# Patient Record
Sex: MALE | Race: WHITE | NOT HISPANIC OR LATINO | URBAN - METROPOLITAN AREA
[De-identification: names, ages, dates, MRNs, and addresses within clinical notes are randomized per-mention and may not be internally consistent; named-entity substitution may affect disease eponyms.]

---

## 2017-04-20 ENCOUNTER — OFFICE VISIT - HEALTHEAST (OUTPATIENT)
Dept: INTERNAL MEDICINE | Facility: CLINIC | Age: 27
End: 2017-04-20

## 2017-04-20 DIAGNOSIS — R11.0 NAUSEA: ICD-10-CM

## 2017-04-20 DIAGNOSIS — R53.81 MALAISE: ICD-10-CM

## 2017-04-20 DIAGNOSIS — K52.9 GASTROENTERITIS: ICD-10-CM

## 2017-04-20 ASSESSMENT — MIFFLIN-ST. JEOR: SCORE: 1988.72

## 2017-04-21 ENCOUNTER — COMMUNICATION - HEALTHEAST (OUTPATIENT)
Dept: INTERNAL MEDICINE | Facility: CLINIC | Age: 27
End: 2017-04-21

## 2019-02-13 ENCOUNTER — RECORDS - HEALTHEAST (OUTPATIENT)
Dept: ADMINISTRATIVE | Facility: OTHER | Age: 29
End: 2019-02-13

## 2019-02-13 ENCOUNTER — AMBULATORY - HEALTHEAST (OUTPATIENT)
Dept: MULTI SPECIALTY CLINIC | Facility: CLINIC | Age: 29
End: 2019-02-13

## 2019-02-13 LAB — HBA1C MFR BLD: 7.5 % (ref 0–5.6)

## 2019-03-26 ENCOUNTER — AMBULATORY - HEALTHEAST (OUTPATIENT)
Dept: INTERNAL MEDICINE | Facility: CLINIC | Age: 29
End: 2019-03-26

## 2019-03-26 LAB
A1C_EXT- HISTORICAL: 7.5
CHLAMYDIA_EXT- HISTORICAL: NORMAL
INR_EXT- HISTORICAL: NORMAL
LDLC SERPL CALC-MCNC: NORMAL MG/DL
LEAD_EXT- HISTORICAL: NORMAL
MICROALB_EXT- HISTORICAL: NORMAL
PAP SMEAR - HIM PATIENT REPORTED: NORMAL

## 2021-05-30 VITALS — WEIGHT: 218.19 LBS | HEIGHT: 72 IN | BODY MASS INDEX: 29.55 KG/M2

## 2021-06-02 ENCOUNTER — RECORDS - HEALTHEAST (OUTPATIENT)
Dept: ADMINISTRATIVE | Facility: CLINIC | Age: 31
End: 2021-06-02

## 2021-06-10 NOTE — PROGRESS NOTES
[facility] Follow Up Note    Assessment/Plan:    1. Malaise and gastroenteritis  Because of most likely his symptoms are viral.  Previous testing for strep and mono were negative.  Discussed that mononucleosis might be falsely negative if checked too early.  Giving his lymphadenopathy in the posterior cervical chain which is fairly significant and I suspect that he does have mononucleosis.  Despite that his malaise is due to dehydration.  Discussed that he will need to increase oral fluid intake and at least drink 5 Pedialyte in a day.  He will continue insulin coverage for his sugars.  We will check blood work today.  If he is not feeling better or feeling worse I did encourage him to come to the emergency room.  - HM1(CBC and Differential)  - Comprehensive Metabolic Panel  - HM1 (CBC with Diff)    2. Nausea  Vomiting.  I will give him some Zofran so he can drink more fluids orally.  If unable to hydrate orally will need IV fluids.  - ondansetron (ZOFRAN) 4 MG tablet; Take 1 tablet (4 mg total) by mouth every 8 (eight) hours as needed for nausea.  Dispense: 20 tablet; Refill: 0    3.  Type 1 diabetes.  Continue insulin.  She will check sugars at home 3 times a day and cover as needed.    Precious Post MD    Chief Complaint:  Chief Complaint   Patient presents with     Establish Care     Chills     nausea, diarrhea, lethargic x 2 days     Headache       History of Present Illness:  Kenan is a 27 y.o. male history of type 1 diabetes, increased creatinine levels who is currently here for acute visit due to not feeling well.    Patient symptoms started 3 days ago.  First he developed lymphadenopathy in his neck and then subsequently a headache, chills significant fatigue and feeling like he is sleepy all the time.  He denies any sore throat.  Had only mild nasal congestion.  His appetite is very low and he has not eaten anything in the last 36 hours.  Yesterday he also started having diarrhea: 5 bowel  movements a day, watery.  He has mild nausea but no vomiting.  He has been checking his sugars.  This morning his sugar was 110.  Yesterday his sugar was around 200.  Patient continues to use 20 units of long-acting insulin as well as sliding scale on short acting insulin and coverage for carbohydrate intake.  Patient complains of frontal diffuse headache.  No fevers.  Over-the-counter he has tried Advil.  Blood work from care everywhere was reviewed.  His A1c was checked earlier this year was 7.1.  His creatinine is chronically elevated and the most recent value was 1.79.  He was told that his creatinine function is elevated because of exercise (patient works as a cross feet  and instructor).    Because patient was not feeling well [facility]  Testing that was negative for strep and mononucleosis and the recommended that he goes to urgent care.  He did go to urgent care yesterday and they did not do anything but recommended that he comes to the emergency room.  Patient has not done that yet.  We discussed that most likely he is dehydrated and getting IV fluids in the emergency room might be helpful.  Currently patient drinks Pedialyte but only had 2 bottles since yesterday.  He has not been eating solid foods.    Review of Systems:  A comprehensive review of systems was performed and was otherwise negative    PFSH:  Social History: Reviewed  History   Smoking Status     Never Smoker   Smokeless Tobacco     Not on file     Social History     Social History Narrative       Past History: Reviewed  Current Outpatient Prescriptions   Medication Sig Dispense Refill     blood glucose meter (GLUCOMETER) One touch ultra meter       blood glucose test strips Dispense item covered by pt ins. Patient testing 12 times / day.       blood glucose test strips Patient testing 12 times daily       generic lancets Patient testing 12 times daily       insulin glargine (LANTUS; BASAGLAR) 100  "unit/mL (3 mL) pen Inject 20 Units under the skin at bedtime.       insulin lispro (HUMALOG KWIKPEN) 100 unit/mL pen Inject under the skin.       omega-3 fatty acids-fish oil 340-1,000 mg cap Take by mouth.       pen needle, diabetic 32 gauge x 1/4\" Ndle For administering insulin at home 8 times daily.  For administering insulin at home 8 times daily       ondansetron (ZOFRAN) 4 MG tablet Take 1 tablet (4 mg total) by mouth every 8 (eight) hours as needed for nausea. 20 tablet 0     No current facility-administered medications for this visit.        Physical Exam:    Vitals:    04/20/17 1112   BP: 100/64   Patient Site: Left Arm   Patient Position: Sitting   Cuff Size: Adult Regular   Pulse: 86   Resp: 20   Temp: 99.7  F (37.6  C)   TempSrc: Tympanic   SpO2: 97%   Weight: 218 lb 3 oz (99 kg)   Height: 5' 11.75\" (1.822 m)     Wt Readings from Last 3 Encounters:   04/20/17 218 lb 3 oz (99 kg)   01/03/15 213 lb (96.6 kg)     Body mass index is 29.8 kg/(m^2).    Constitutional:  Reveals a pleasant but tired appearing male , awake alert and oriented ×3.  Vitals:  Per nursing notes.  HEENT: Positive for cervical LAD in the anterior and posterior chain, no thyromegaly,  conjunctiva is pink, no scleral icterus, TMs are visualized and normal bl, oropharynx is clear, no exudates,   Cardiac:  Regular rate and rhythm,no murmurs, rubs, or gallops.Legs without edema. Palpation of the radial pulse regular.  Lungs: Clear to auscultation bl.  Respiratory effort normal.  Abdomen:positive BS, soft, nontender, nondistended.  No hepato-splenomagaly  Skin:   Without rash, bruise, or palpable lesions.  Psychiatric: affect appropriate, memory intact.     Data Review:    Analysis and Summary of Old Records (2): Yes care everywhere    Records Requested (1): No    Other History Summarized (from other people in the room) (2): No    Radiology Tests Summarized (XRAY/CT/MRI/DXA) (1): No    Labs Reviewed (1): Yes    Medicine Tests Reviewed " (EKG/ECHO/COLONOSCOPY/EGD) (1): No    Independent Review of EKG or X-RAY (2): No

## 2024-09-03 ENCOUNTER — OFFICE VISIT (OUTPATIENT)
Dept: PLASTIC SURGERY | Facility: CLINIC | Age: 34
End: 2024-09-03
Payer: COMMERCIAL

## 2024-09-03 DIAGNOSIS — J34.2 DEVIATED NASAL SEPTUM: ICD-10-CM

## 2024-09-03 DIAGNOSIS — J34.3 NASAL TURBINATE HYPERTROPHY: ICD-10-CM

## 2024-09-03 DIAGNOSIS — S02.2XXD CLOSED FRACTURE OF NASAL BONE WITH ROUTINE HEALING, SUBSEQUENT ENCOUNTER: ICD-10-CM

## 2024-09-03 DIAGNOSIS — J34.89 NASAL VALVE STENOSIS: ICD-10-CM

## 2024-09-03 DIAGNOSIS — J34.89 NASAL OBSTRUCTION: Primary | ICD-10-CM

## 2024-09-03 NOTE — PROGRESS NOTES
Facial Plastic and Reconstructive Surgery Consultation    Referring Provider:   Kavin Orellana MD  [address]  [place] [address]      HPI:  I had the pleasure of seeing Kenan Mckeon today for nasal obstruction.  Kenan Mckeon is a 34 year old male. He had an injury to his nose 2 months ago in July while doing Jiu Jitsu. He was seen by Dr. Orellana who noted that a closed nasal reduction was not indicated and referred for further vision.  He notes that he was hit in the nose given a couple weeks ago bleeding from the nose at that time.  Never had surgery on his nose before.  He has difficulty breathing through the right greater than left side of the nose.  He does have seasonal allergies but even with those well-controlled he has difficulty breathing through his nose.  He started Flonase consistently for 3 months without improvement in his breathing.  He does not get regular epistaxis.  In addition to the bleeding is interested on taking the bump down.    He does digit doing CrossFit.  He is not interested in stopping jujitsu for likely 15 to 20 years.  2    Review Of Systems  ROS: 10 point ROS neg other than the symptoms noted above in the HPI.    Patient Active Problem List   Diagnosis    Varicose Veins    Obesity    Type 1 Diabetes Mellitus     No past surgical history on file.  Current Outpatient Medications   Medication Sig Dispense Refill    Insulin Aspart (NOVOLOG SC) Inject  Subcutaneous.        Insulin Glargine (LANTUS SC) Inject  Subcutaneous.         Patient has no known allergies.  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      Social Determinants of Health     Financial Resource Strain: High Risk (1/1/2022)    [facility]    Financial Resource Strain     Difficulty of Paying Living Expenses: Not on file     Difficulty of Paying Living Expenses: Not on file   Food Insecurity: Not on file   Transportation Needs: Not on file   Physical Activity: Not on file   Stress: Not on file   Social Connections: Unknown (1/29/2024)    Received from [facility], [employer] [facility]    Social Connections     Frequency of Communication with Friends and Family: Not on file   Interpersonal Safety: Not on file   Housing Stability: Not on file     Family History   Problem Relation Age of Onset    No Known Problems Mother     No Known Problems Father        PE:  Alert and Oriented, Answering Questions Appropriately  Atraumatic, Normocephalic, Face Symmetric  Skin: Leyva 2 , Skin Thickness: Medium  Facial Nerve Intact and facial movement symmetric  EOMI  Nasal Exam: On frontal view, he has significant bony deviation to the left.  He has a palpable bony ledge on the left consistent with a likely prior fracture.  He has shifting his mid vault from the left and narrowing of his right internal nasal valves.  He has significant improvement in breathing with the right greater than left modified Zavala maneuver.  His caudal septum is fairly midline.  See procedure note below.  He has bilateral inferior turbinate hypertrophy.  He has read curvature of his right greater than left lower lateral cartilages.  Chin: Normal   Lips/Teeth/Toungue/Gums:  Lips intact  Neck: trachea midline  Chest: No wheezing, cyanosis, or stridor  Card: Not diaphoretic  Neuro/Psych: CN's 2-12 intact, Moves all extremities, ambulation in intact, positive affect, no notable muscle weakness          PROCEDURE:Nasal endoscopy    Indication: Nasal Endoscopy is performed to provide a more thorough evaluation of the nasal airway than seen on standard nasal speculum exam.    Performed by: Jessica Watt MD      Findings are as follows: He has a significant rightward septal deviation that is contacting the middle turbinate, inferior turbinate, and lateral nasal wall.  He has no airway side.  On the left, he has inferior turban hypertrophy which narrows the airway some.  No septal perforation.    Imaging: CT max face from July shows subacute fractures.  Also has nasal bone deviation and septal deviation.        IMPRESSION/PLAN: This is a 34-year-old male presenting for eval ration of nasal obstruction and a history of prior trauma.  He does kimberleeu a history of nasal bone trauma and fractures.  Has never had surgery of his nose before.  On examination, he has significant nasal bone, mid vault, and nasal septal deviation along with inferior turbinate hypertrophy and internal nasal valve collapse.  Septoplasty alone would not improve his breathing.  In order to exsufflated he did not open septorhinoplasty with osteotomies,  grafts right greater than left, septal reconstruction, and inferior turbinate reduction.  A septoplasty alone would not improve his bleeding.  He may also need lateral crural strut grafts due to the curvature of his lower lateral cartilages. We discussed the possible need for auricular cartilage and/or donor rib cartilage grafts.     I was candid with him that ideally he will be done with paul prior to having surgery on his nose due to the risk of reinjuring the nose requiring additional surgery.  We discussed that the risks of the surgery go up with each  subsequent surgery.  He decided that his breathing is bad enough that he wants to have surgery because he is done with paul.  We discussed that I would like him to be without any heavy lifting strenuous activity for 4 to 6 weeks postop and no jiu jitsu for 2-3 months after surgery.  In addition, he had a fracture recently and therefore to be at least 6 months before offering him any surgery.     We discussed a cosmetic dorsal hump takedown as well.     Risks and benefits of the procedure  were discussed in depth with the patient including, but not limited to, asymmetry, septal perforation, postoperatively bleeding, irregularities, need for additional procedures, and infection.     Photodocumentation was obtained.    -open septorhinoplasty   -repair of nasal vestibular stenosis  -bilateral inferior turbinate reduction and outfracture  -possible auricular cartilage graft  -possible donor rib cartilage

## 2024-09-04 NOTE — NURSING NOTE
Photo documentation obtained.    Obtained consent for nasal endoscopy.     Gave pt a cosmetic quote for dorsal hump reduction (see Media tab).    Discussed quote/GFE in detail with pt, including the fact that anesthesia and facility fees are an estimate based on time and may be subject to change. We also discussed that a non-refundable deposit of 50% of the surgeon's fee is collected at the time of scheduling, with the remaining surgeon's fee due three weeks prior to surgery.    Pt verbalized understanding of financial responsibility if he decides to pursue cosmetic surgery.     Yissel Muir RN  09/04/24 11:50 AM